# Patient Record
Sex: FEMALE | URBAN - METROPOLITAN AREA
[De-identification: names, ages, dates, MRNs, and addresses within clinical notes are randomized per-mention and may not be internally consistent; named-entity substitution may affect disease eponyms.]

---

## 2022-07-01 ENCOUNTER — APPOINTMENT (RX ONLY)
Dept: URBAN - METROPOLITAN AREA CLINIC 26 | Facility: CLINIC | Age: 62
Setting detail: DERMATOLOGY
End: 2022-07-01

## 2022-07-01 DIAGNOSIS — R23.3 SPONTANEOUS ECCHYMOSES: ICD-10-CM

## 2022-07-01 PROCEDURE — 99202 OFFICE O/P NEW SF 15 MIN: CPT

## 2022-07-01 PROCEDURE — ? DIAGNOSIS COMMENT

## 2022-07-01 PROCEDURE — ? COUNSELING

## 2022-07-01 PROCEDURE — ? ADDITIONAL NOTES

## 2022-07-01 ASSESSMENT — LOCATION SIMPLE DESCRIPTION DERM: LOCATION SIMPLE: RIGHT ELBOW

## 2022-07-01 ASSESSMENT — LOCATION DETAILED DESCRIPTION DERM: LOCATION DETAILED: RIGHT ELBOW

## 2022-07-01 ASSESSMENT — LOCATION ZONE DERM: LOCATION ZONE: ARM

## 2022-07-01 NOTE — PROCEDURE: DIAGNOSIS COMMENT
Render Risk Assessment In Note?: no
Comment: Based on patient history and photos, clear on exam today
Detail Level: Simple

## 2022-07-01 NOTE — HPI: SKIN LESION
How Severe Is Your Skin Lesion?: mild
Is This A New Presentation, Or A Follow-Up?: Skin Lesion
Which Family Member (Optional)?: Father
Additional History: Patient states she treated the lesion with medical jair and it has cleared.

## 2025-02-05 ENCOUNTER — APPOINTMENT (OUTPATIENT)
Dept: RADIOLOGY | Facility: CLINIC | Age: 65
End: 2025-02-05
Payer: MEDICARE

## 2025-02-05 ENCOUNTER — OFFICE VISIT (OUTPATIENT)
Dept: OBGYN CLINIC | Facility: CLINIC | Age: 65
End: 2025-02-05
Payer: MEDICARE

## 2025-02-05 VITALS — WEIGHT: 199 LBS | BODY MASS INDEX: 31.23 KG/M2 | HEIGHT: 67 IN

## 2025-02-05 DIAGNOSIS — M75.41 IMPINGEMENT SYNDROME OF RIGHT SHOULDER: Primary | ICD-10-CM

## 2025-02-05 DIAGNOSIS — M25.511 ACUTE PAIN OF RIGHT SHOULDER: ICD-10-CM

## 2025-02-05 DIAGNOSIS — M75.81 TENDINITIS OF RIGHT ROTATOR CUFF: ICD-10-CM

## 2025-02-05 PROCEDURE — 20610 DRAIN/INJ JOINT/BURSA W/O US: CPT | Performed by: ORTHOPAEDIC SURGERY

## 2025-02-05 PROCEDURE — 73030 X-RAY EXAM OF SHOULDER: CPT

## 2025-02-05 PROCEDURE — 99204 OFFICE O/P NEW MOD 45 MIN: CPT | Performed by: ORTHOPAEDIC SURGERY

## 2025-02-05 RX ORDER — METOPROLOL SUCCINATE 50 MG/1
50 TABLET, EXTENDED RELEASE ORAL DAILY
COMMUNITY

## 2025-02-05 RX ORDER — ATORVASTATIN CALCIUM 20 MG/1
20 TABLET, FILM COATED ORAL DAILY
COMMUNITY

## 2025-02-05 RX ORDER — FAMOTIDINE 10 MG
40 TABLET ORAL DAILY
COMMUNITY

## 2025-02-05 RX ORDER — HYOSCYAMINE SULFATE 0.125 MG
0.12 TABLET ORAL EVERY 4 HOURS PRN
COMMUNITY

## 2025-02-05 RX ORDER — BETAMETHASONE SODIUM PHOSPHATE AND BETAMETHASONE ACETATE 3; 3 MG/ML; MG/ML
6 INJECTION, SUSPENSION INTRA-ARTICULAR; INTRALESIONAL; INTRAMUSCULAR; SOFT TISSUE
Status: COMPLETED | OUTPATIENT
Start: 2025-02-05 | End: 2025-02-05

## 2025-02-05 RX ORDER — LIDOCAINE HYDROCHLORIDE 10 MG/ML
5 INJECTION, SOLUTION INFILTRATION; PERINEURAL
Status: COMPLETED | OUTPATIENT
Start: 2025-02-05 | End: 2025-02-05

## 2025-02-05 RX ORDER — FLUTICASONE PROPIONATE 50 MCG
1 SPRAY, SUSPENSION (ML) NASAL DAILY
COMMUNITY

## 2025-02-05 RX ORDER — LIDOCAINE 50 MG/G
1 PATCH TOPICAL DAILY
COMMUNITY
Start: 2024-11-25

## 2025-02-05 RX ORDER — EZETIMIBE 10 MG/1
10 TABLET ORAL DAILY
COMMUNITY

## 2025-02-05 RX ORDER — LOSARTAN POTASSIUM 50 MG/1
50 TABLET ORAL DAILY
COMMUNITY

## 2025-02-05 RX ADMIN — LIDOCAINE HYDROCHLORIDE 5 ML: 10 INJECTION, SOLUTION INFILTRATION; PERINEURAL at 13:00

## 2025-02-05 RX ADMIN — BETAMETHASONE SODIUM PHOSPHATE AND BETAMETHASONE ACETATE 6 MG: 3; 3 INJECTION, SUSPENSION INTRA-ARTICULAR; INTRALESIONAL; INTRAMUSCULAR; SOFT TISSUE at 13:00

## 2025-02-05 NOTE — PROGRESS NOTES
Assessment:     1. Impingement syndrome of right shoulder    2. Tendinitis of right rotator cuff    3. Acute pain of right shoulder        Plan:     Problem List Items Addressed This Visit          Musculoskeletal and Integument    Impingement syndrome of right shoulder - Primary    Relevant Medications    betamethasone acetate-betamethasone sodium phosphate (CELESTONE) injection 6 mg (Completed)    lidocaine (XYLOCAINE) 1 % injection 5 mL (Completed)    Other Relevant Orders    Large joint arthrocentesis: R subacromial bursa (Completed)    Ambulatory Referral to Physical Therapy    Tendinitis of right rotator cuff    Relevant Medications    betamethasone acetate-betamethasone sodium phosphate (CELESTONE) injection 6 mg (Completed)    lidocaine (XYLOCAINE) 1 % injection 5 mL (Completed)    Other Relevant Orders    Large joint arthrocentesis: R subacromial bursa (Completed)    Ambulatory Referral to Physical Therapy     Other Visit Diagnoses         Acute pain of right shoulder        Relevant Orders    XR shoulder 2+ vw right         Findings consistent with right shoulder impingement, tendonitis, mild ac joint arthritis. X-ray and prognosis reviewed with patient. Reviewed conservative treatment of her condition. Patient was given subacromial cortisone injection in right shoulder, tolerated procedure well, cold compress today. Main treatment is physical therapy and referral placed. Avoid strenuous lifting, repetitive overhead movements. Continue with topical voltaren gel and add tylenol for pain control. See patient back in 2 months to re evaluate. No concern for rotator cuff tear. All patient's questions were answered to her satisfaction.  This note is created using dictation transcription.  It may contain typographical errors, grammatical errors, improperly dictated words, background noise and other errors.    Subjective:     Patient ID: Maribel Albarado is a 64 y.o. female.  Chief Complaint:  64 yr old female in for  evaluation of right shoulder pain. No known injury. Pain started in December. She thinks she may have slept wrong on arm but doesn't recall waking up with pain. Pain lateral aspect of shoulder to elbow and not below. Pain can be mild to sharp but typically sharp with movement reaching above, behind out to grab items. She maintains full motion but with pain. She does have history of cervical fusion 2 levels lower and has no numbness or tingling in arm. No pain in elbow joint.     Allergy:  Allergies   Allergen Reactions    Pregabalin Shortness Of Breath     Worst asthma attack ever    Amitriptyline Swelling    Aspirin GI Intolerance     Not 81 mg     Celebrex [Celecoxib] GI Intolerance    Codeine Dizziness     Tired difficulty concentrating    Gabapentin Swelling     Rash constipation    Latex Rash    Nortriptyline Swelling and Rash     Medications:  all current active meds have been reviewed  Past Medical History:  Past Medical History:   Diagnosis Date    Asthma     Breast cancer (HCC)     Colitis     Costochondritis     Degenerative arthritis     Diverticulosis     Hypertension     Ischemia      Past Surgical History:  Past Surgical History:   Procedure Laterality Date    BREAST LUMPECTOMY N/A     CARPAL TUNNEL RELEASE      TUBAL LIGATION Bilateral      Family History:  Family History   Problem Relation Age of Onset    Cancer Mother     Vascular Disease Father     Cancer Father      Social History:  Social History     Substance and Sexual Activity   Alcohol Use None     Social History     Substance and Sexual Activity   Drug Use Not on file     Social History     Tobacco Use   Smoking Status Never   Smokeless Tobacco Never     Review of Systems   Constitutional:  Negative for chills and fever.   HENT:  Negative for ear pain and sore throat.    Eyes:  Negative for pain and visual disturbance.   Respiratory:  Negative for cough and shortness of breath.    Cardiovascular:  Negative for chest pain and palpitations.  "  Gastrointestinal:  Negative for abdominal pain and vomiting.   Genitourinary:  Negative for dysuria and hematuria.   Musculoskeletal:  Positive for arthralgias (right shoulder). Negative for back pain.   Skin:  Negative for color change and rash.   Neurological:  Negative for seizures and syncope.   All other systems reviewed and are negative.        Objective:  BP Readings from Last 1 Encounters:   No data found for BP      Wt Readings from Last 1 Encounters:   02/05/25 90.3 kg (199 lb)      BMI:   Estimated body mass index is 31.17 kg/m² as calculated from the following:    Height as of this encounter: 5' 7\" (1.702 m).    Weight as of this encounter: 90.3 kg (199 lb).  BSA:   Estimated body surface area is 2.02 meters squared as calculated from the following:    Height as of this encounter: 5' 7\" (1.702 m).    Weight as of this encounter: 90.3 kg (199 lb).   Physical Exam  Vitals and nursing note reviewed.   Constitutional:       Appearance: Normal appearance. She is well-developed.   HENT:      Head: Normocephalic and atraumatic.      Right Ear: External ear normal.      Left Ear: External ear normal.   Eyes:      Extraocular Movements: Extraocular movements intact.      Conjunctiva/sclera: Conjunctivae normal.   Pulmonary:      Effort: Pulmonary effort is normal.   Musculoskeletal:         General: Tenderness (right shoulder arthralgia) present.      Cervical back: Neck supple.   Skin:     General: Skin is warm and dry.   Neurological:      Mental Status: She is alert and oriented to person, place, and time.      Deep Tendon Reflexes: Reflexes are normal and symmetric.   Psychiatric:         Mood and Affect: Mood normal.         Behavior: Behavior normal.       Right Shoulder Exam     Tenderness   Right shoulder tenderness location: anterolateral aspect of shoulder.    Range of Motion   Active abduction:  abnormal Right shoulder active abduction: pain.  Passive abduction:  normal   Forward flexion:  170 (pain) "     Muscle Strength   Abduction: 5/5   Internal rotation: 5/5   External rotation: 5/5   Supraspinatus: 5/5   Biceps: 5/5     Tests   Cross arm: negative  Impingement: positive  Drop arm: negative    Other   Erythema: absent  Scars: absent  Sensation: normal  Pulse: present    Comments:  Good strength against resisted ABD  Pain with speed's and O'briens            I have personally reviewed pertinent films in PACS and my interpretation is x-ray right shoulder well maintained glenohumeral space and mild ac joint arthritis, no calcifications, no fracture, dislocation.      Large joint arthrocentesis: R subacromial bursa  Universal Protocol:  Consent: Verbal consent obtained.  Risks and benefits: risks, benefits and alternatives were discussed  Consent given by: patient  Patient understanding: patient states understanding of the procedure being performed  Site marked: the operative site was marked  Patient identity confirmed: verbally with patient  Supporting Documentation  Indications: pain   Procedure Details  Location: shoulder - R subacromial bursa  Preparation: Patient was prepped and draped in the usual sterile fashion  Needle size: 22 G  Ultrasound guidance: no  Approach: posterolateral  Medications administered: 6 mg betamethasone acetate-betamethasone sodium phosphate 6 (3-3) mg/mL; 5 mL lidocaine 1 %    Patient tolerance: patient tolerated the procedure well with no immediate complications  Dressing:  Sterile dressing applied        Scribe Attestation      I,:  Tam Moore am acting as a scribe while in the presence of the attending physician.:       I,:  Ashkan Thornton MD personally performed the services described in this documentation    as scribed in my presence.:

## 2025-02-06 ENCOUNTER — TELEPHONE (OUTPATIENT)
Dept: OBGYN CLINIC | Facility: CLINIC | Age: 65
End: 2025-02-06

## 2025-02-17 ENCOUNTER — EVALUATION (OUTPATIENT)
Dept: PHYSICAL THERAPY | Facility: CLINIC | Age: 65
End: 2025-02-17
Payer: MEDICARE

## 2025-02-17 DIAGNOSIS — M75.41 IMPINGEMENT SYNDROME OF RIGHT SHOULDER: ICD-10-CM

## 2025-02-17 DIAGNOSIS — M75.81 TENDINITIS OF RIGHT ROTATOR CUFF: ICD-10-CM

## 2025-02-17 PROCEDURE — 97530 THERAPEUTIC ACTIVITIES: CPT | Performed by: PHYSICAL THERAPIST

## 2025-02-17 PROCEDURE — 97161 PT EVAL LOW COMPLEX 20 MIN: CPT | Performed by: PHYSICAL THERAPIST

## 2025-02-17 PROCEDURE — 97112 NEUROMUSCULAR REEDUCATION: CPT | Performed by: PHYSICAL THERAPIST

## 2025-02-17 NOTE — PROGRESS NOTES
PT Evaluation     Today's date: 2025  Patient name: Maribel Albarado  : 1960  MRN: [id number]  Referring provider: Ashkan Thornton MD  Dx:   Encounter Diagnosis     ICD-10-CM    1. Tendinitis of right rotator cuff  M75.81 Ambulatory Referral to Physical Therapy      2. Impingement syndrome of right shoulder  M75.41 Ambulatory Referral to Physical Therapy             Assessment  Impairments: abnormal or restricted ROM, activity intolerance, impaired physical strength and pain with function  Symptom irritability: low    Assessment details: Maribel Albarado is a 65 y.o. female presenting to outpatient physical therapy at [facility] with complaints of R shoulder pain, stiffness and weakness. She presents with decreased range of motion, decreased strength, limited flexibility, poor postural awareness, poor body mechanics, decreased tolerance to activity and decreased functional mobility due to Tendinitis of right rotator cuff, Impingement syndrome of right shoulder.  She would benefit from skilled PT services in order to address these deficits and reach maximum level of function.  Thank you for the referral!    Understanding of Dx/Px/POC: good     Prognosis: good    Goals  STG  1. Independent with HEP in 2 weeks  2. Decrease pain at worst by 50% in 2 weeks    LTG  1. Increase R shoulder strength in all planes to 5/5 in 5 weeks  2. Return to full, pain-free and unrestricted reaching and lifting ADLs in 5 weeks        Plan  Patient would benefit from: skilled physical therapy  Planned modality interventions: thermotherapy: hydrocollator packs    Planned therapy interventions: manual therapy, neuromuscular re-education, therapeutic exercise and therapeutic activities    Frequency: 2x week  Duration in weeks: 5  Treatment plan discussed with: patient      Subjective Evaluation    History of Present Illness  Mechanism of injury: Recently c/o R shoulder pain, presenting today with dx of R shoulder impingement. Pt  believes her R shoulder pain was from being moved from surgical table via pulling her arms (spinal simulator 10/25/204). Recent cortisone injection by Dr. Thornton has improved her symptoms. Denies recent neck injuries, however PMH includes C6-7 fusion As well as  x4 spinal cord stimulator surgical procedures. Denies pain without coughing/sneezing, no pain at night in R shoulder.  Quality of life: good    Patient Goals  Patient goals for therapy: decreased pain, increased motion, increased strength and independence with ADLs/IADLs  Patient goal: lift 10-30lbs for ADLs  Pain  Current pain ratin  At worst pain rating: 10  Location: R anterolateral shoulder as well as R biceps and triceps  Quality: dull ache and sharp  Relieving factors: medications  Aggravating factors: lifting and overhead activity  Progression: improved      Diagnostic Tests  X-ray: normal  Treatments  Previous treatment: injection treatment      Objective     Palpation   Left   Tenderness of the infraspinatus, supraspinatus, teres major and teres minor.   Trigger point to infraspinatus.     Neurological Testing     Sensation     Shoulder   Left Shoulder   Intact: light touch    Right Shoulder   Intact: Light touch    Active Range of Motion   Left Shoulder   Flexion: 130 degrees with pain  Extension: 30 degrees with pain  External rotation 0°: 50 degrees with pain  External rotation BTH: C2 with pain  Internal rotation BTB: L1 with pain    Strength/Myotome Testing     Left Shoulder   Normal muscle strength    Right Shoulder     Planes of Motion   Flexion: 4+   Extension: 4   Abduction: 4   External rotation at 0°: 4   Internal rotation at 0°: 4   Horizontal abduction: 4     Isolated Muscles   Lower trapezius: 4   Middle trapezius: 4     Tests     Right Shoulder   Positive crossover, Hawkin's and passive horizontal adduction.   Negative drop arm, empty can and external rotation lag sign.             Daily Treatment Diary     HEP ACCESS CODE: [id number]  "  FOTO Completed On: 2/17/2025    POC Expires Reeval for Medicare to be completed  Unit Limit Auth Expiration Date PT/OT/STVisit Limit   3/30/25 By visit 10 na na bomn    Completed on visit                    Auth Status DATE 2/17        Approved Visit # 1         Remaining         MANUAL THERAPY         R RTC TrP & MFR         R shoulder PROm                                             THERAPEUTIC EXERCISE HEP         OH pulleys          Doorway pec stretch           Open book stretch          Wand Ext          Wand IR          Wand OH flx                                                                                                    NEUROMUSCULAR REEDUCATION           Mid row tband          Shld ext tband          ER/IR tband          Supine h-abd band          Sideying trio          Standing trio          Supine alphabet          Scapular retraction iso 2/17 10x10\"                                                                    THERAPEUTIC ACTIVITY          Patient education: pathoanatomy, nature of sxs, POC, HEP  NS        UBE w/u                              GAIT TRAINING                                                  MODALITIES                                         "

## 2025-03-03 ENCOUNTER — OFFICE VISIT (OUTPATIENT)
Dept: PHYSICAL THERAPY | Facility: CLINIC | Age: 65
End: 2025-03-03
Payer: MEDICARE

## 2025-03-03 DIAGNOSIS — M75.81 TENDINITIS OF RIGHT ROTATOR CUFF: Primary | ICD-10-CM

## 2025-03-03 DIAGNOSIS — M75.41 IMPINGEMENT SYNDROME OF RIGHT SHOULDER: ICD-10-CM

## 2025-03-03 PROCEDURE — 97140 MANUAL THERAPY 1/> REGIONS: CPT

## 2025-03-03 PROCEDURE — 97112 NEUROMUSCULAR REEDUCATION: CPT

## 2025-03-03 PROCEDURE — 97110 THERAPEUTIC EXERCISES: CPT

## 2025-03-03 NOTE — PROGRESS NOTES
"Daily Note     Today's date: 3/3/2025  Patient name: Maribel Albarado  : 1960  MRN: [id number]  Referring provider: Ashkan Thornton MD  Dx:   Encounter Diagnosis     ICD-10-CM    1. Tendinitis of right rotator cuff  M75.81       2. Impingement syndrome of right shoulder  M75.41                      Subjective: Pt reports her shld feels pretty good, but everything else on her body hurts.      Objective: See treatment diary below      Assessment: Tolerated treatment fair. Patient demonstrated fatigue post treatment and would benefit from continued PT for c cont'd work on shld ROM and strengthening.  Pt txfs slow and gingerly.  Pt able to lie supine on a pillow.  Pt's LBP monitored t/o session.      Plan: Continue per plan of care.  Progress treatment as tolerated.       Daily Treatment Diary     HEP ACCESS CODE: [id number]   FOTO Completed On: 2025    POC Expires Reeval for Medicare to be completed  Unit Limit Auth Expiration Date PT/OT/STVisit Limit   3/30/25 By visit 10 na na bomn    Completed on visit                    Auth Status DATE 2/17 3/3       Approved Visit # 1 2        Remaining         MANUAL THERAPY         R RTC TrP & MFR         R shoulder PROm                                             THERAPEUTIC EXERCISE HEP         OH pulleys   3'flex       Doorway pec stretch           Open book stretch          Wand Ext   10x5\"       Wand IR   10x5\"       Wand OH flx   15x5\"                                                                                                 NEUROMUSCULAR REEDUCATION           Mid row tband   OTB x15       Shld ext tband   OTB x15       ER/IR tband   ER BL peach TB 15x3\"       Supine h-abd band   [place] TB 15x5\"       Sideying trio          Standing trio          Supine alphabet          Scapular retraction iso  10x10\" 10x5\"                                                                   THERAPEUTIC ACTIVITY          Patient education: pathoanatomy, nature of sxs, POC, " HEP  NS        UBE w/u seated   2'/2'                           GAIT TRAINING                                                  MODALITIES

## 2025-03-05 ENCOUNTER — OFFICE VISIT (OUTPATIENT)
Dept: PHYSICAL THERAPY | Facility: CLINIC | Age: 65
End: 2025-03-05
Payer: MEDICARE

## 2025-03-05 DIAGNOSIS — M75.81 TENDINITIS OF RIGHT ROTATOR CUFF: Primary | ICD-10-CM

## 2025-03-05 DIAGNOSIS — M75.41 IMPINGEMENT SYNDROME OF RIGHT SHOULDER: ICD-10-CM

## 2025-03-05 PROCEDURE — 97110 THERAPEUTIC EXERCISES: CPT

## 2025-03-05 PROCEDURE — 97140 MANUAL THERAPY 1/> REGIONS: CPT

## 2025-03-05 PROCEDURE — 97112 NEUROMUSCULAR REEDUCATION: CPT

## 2025-03-05 NOTE — PROGRESS NOTES
"Daily Note     Today's date: 3/5/2025  Patient name: Maribel Albarado  : 1960  MRN: [id number]  Referring provider: Ashkan Thornton MD  Dx:   Encounter Diagnosis     ICD-10-CM    1. Tendinitis of right rotator cuff  M75.81       2. Impingement syndrome of right shoulder  M75.41                      Subjective: Pt reports she had increased LBP post LV and prefers not to lie supine.      Objective: See treatment diary below      Assessment: Tolerated treatment well w/ progression of shld ex's. Patient w/ TrP's in infraspinatus resolved w/ manual TrP rls.  Pt w/ improved shld ROM w/ no pain reported post RX.  All ex's and manuals performed in sitting or standing to prevent aggravating the LB.  Pt w/ ant shld posture.  Pt made aware of correct shld posture and instructed to be more aware to correct it.      Plan: Continue per plan of care.  Progress treatment as tolerated.       Daily Treatment Diary     HEP ACCESS CODE: [id number]   FOTO Completed On: 2025    POC Expires Reeval for Medicare to be completed  Unit Limit Auth Expiration Date PT/OT/STVisit Limit   3/30/25 By visit 10 na na bomn    Completed on visit                    Auth Status DATE 2/17 3/3 3/5      Approved Visit # 1 2 3       Remaining         MANUAL THERAPY         R RTC TrP & MFR sitting   JK      R shoulder PROm sitting   JK                                          THERAPEUTIC EXERCISE HEP         OH pulleys   3'flex 3'flex      Doorway pec stretch           Open book stretch          Wand Ext   10x5\" 10x5\"      Wand IR   10x5\" 10x5\"      Wand OH flx stand   15x5\" 10x5\"                                                                                                NEUROMUSCULAR REEDUCATION           Mid row tband   OTB x15 OTB x20      Shld ext tband   OTB x15 OTB x20      ER/IR tband   ER BL peach TB 15x3\" ER BL peach TB 15x3\"      Supine h-abd band   [place] TB 15x5\" [place] TB 15x5\"      Sideying trio          Standing trio    10x      Supine " "alphabet          Scapular retraction iso 2/17 10x10\" 10x5\" 10x5\"                                                                  THERAPEUTIC ACTIVITY          Patient education: pathoanatomy, nature of sxs, POC, HEP  NS        UBE w/u seated   2'/2' 2'/2'                          GAIT TRAINING                                                  MODALITIES                                          "

## 2025-03-10 ENCOUNTER — OFFICE VISIT (OUTPATIENT)
Dept: PHYSICAL THERAPY | Facility: CLINIC | Age: 65
End: 2025-03-10
Payer: MEDICARE

## 2025-03-10 DIAGNOSIS — M75.41 IMPINGEMENT SYNDROME OF RIGHT SHOULDER: ICD-10-CM

## 2025-03-10 DIAGNOSIS — M75.81 TENDINITIS OF RIGHT ROTATOR CUFF: Primary | ICD-10-CM

## 2025-03-10 PROCEDURE — 97112 NEUROMUSCULAR REEDUCATION: CPT

## 2025-03-10 PROCEDURE — 97140 MANUAL THERAPY 1/> REGIONS: CPT

## 2025-03-10 PROCEDURE — 97110 THERAPEUTIC EXERCISES: CPT

## 2025-03-12 ENCOUNTER — OFFICE VISIT (OUTPATIENT)
Dept: PHYSICAL THERAPY | Facility: CLINIC | Age: 65
End: 2025-03-12
Payer: MEDICARE

## 2025-03-12 DIAGNOSIS — M75.41 IMPINGEMENT SYNDROME OF RIGHT SHOULDER: ICD-10-CM

## 2025-03-12 DIAGNOSIS — M75.81 TENDINITIS OF RIGHT ROTATOR CUFF: Primary | ICD-10-CM

## 2025-03-12 PROCEDURE — 97110 THERAPEUTIC EXERCISES: CPT | Performed by: PHYSICAL THERAPIST

## 2025-03-12 NOTE — PROGRESS NOTES
"Daily Note     Today's date: 3/12/2025  Patient name: Maribel Albarado  : 1960  MRN: [id number]  Referring provider: Ashkan Thornton MD  Dx:   Encounter Diagnosis     ICD-10-CM    1. Tendinitis of right rotator cuff  M75.81       2. Impingement syndrome of right shoulder  M75.41           Start Time: 1411          Subjective: Woke up around 5am with shoulder pain. Thinks it is from doing too much. Felt sore after therapy last visit, but she is used to the arm being sore all the time.      Objective: See treatment diary below      Assessment: Did not perform manuals today secondary to time restraint. She was able to complete most exercises without pain, but commented the banded row irritated her stimulator. She was able to tolerate progression of band resistance. Ended with CP for recovery, pt favorable. She will benefit from continued therapy to maximize function.        Plan: Continue per plan of care.  Progress treatment as tolerated.   Possibly trial thoracic ext for improved shoulder mobility?     Daily Treatment Diary     HEP ACCESS CODE: [id number]   FOTO Completed On: 2025    POC Expires Reeval for Medicare to be completed  Unit Limit Auth Expiration Date PT/OT/STVisit Limit   3/30/25 By visit 10 na na bomn    Completed on visit                    Auth Status DATE 2/17 3/3 3/5 3/10 3/12    Approved Visit # 1 2 3 4 5     Remaining         MANUAL THERAPY         R RTC TrP & MFR sitting   JK JK     R shoulder PROm sitting   JK JK                                         THERAPEUTIC EXERCISE HEP         OH pulleys   3'flex 3'flex 3'flex Flex 3'    Doorway pec stretch           Open book stretch          Wand Ext   10x5\" 10x5\" 10x5\" 10x5\"    Wand IR   10x5\" 10x5\" 10x5\" 10x5\"    Wand OH flx stand   15x5\" 10x5\" 10x5\" 10x5\"                                                                                              NEUROMUSCULAR REEDUCATION           Mid row tband   OTB x15 OTB x20 OTB x20 Gtb x20    Shld ext " "tband   OTB x15 OTB x20 OTB x20 Gtb x20    ER/IR tband   ER BL peach TB 15x3\" ER BL peach TB 15x3\" Peach TB 15x5\" Peach TB 15x5\"    Shld abd band   [place] TB 15x5\" Peach TB 15x5\" Peach TB 15x5\" Peach TB 15x5\"    Sideying trio          Standing trio    10x 10x ea X10 ea    Supine alphabet          Scapular retraction iso 2/17 10x10\" 10x5\" 10x5\" 10x5\"                                                                 THERAPEUTIC ACTIVITY          Patient education: pathoanatomy, nature of sxs, POC, HEP  NS        UBE w/u seated   2'/2' 2'/2' 2'/2' 2'/2'                        GAIT TRAINING                                                  MODALITIES                                            "

## 2025-03-17 ENCOUNTER — OFFICE VISIT (OUTPATIENT)
Dept: PHYSICAL THERAPY | Facility: CLINIC | Age: 65
End: 2025-03-17
Payer: MEDICARE

## 2025-03-17 DIAGNOSIS — M75.41 IMPINGEMENT SYNDROME OF RIGHT SHOULDER: ICD-10-CM

## 2025-03-17 DIAGNOSIS — M75.81 TENDINITIS OF RIGHT ROTATOR CUFF: Primary | ICD-10-CM

## 2025-03-17 PROCEDURE — 97140 MANUAL THERAPY 1/> REGIONS: CPT | Performed by: PHYSICAL THERAPIST

## 2025-03-17 PROCEDURE — 97110 THERAPEUTIC EXERCISES: CPT | Performed by: PHYSICAL THERAPIST

## 2025-03-17 PROCEDURE — 97112 NEUROMUSCULAR REEDUCATION: CPT | Performed by: PHYSICAL THERAPIST

## 2025-03-17 NOTE — PROGRESS NOTES
"Daily Note     Today's date: 3/17/2025  Patient name: Maribel Albarado  : 1960  MRN: [id number]  Referring provider: Ashkan Thornton MD  Dx:   Encounter Diagnosis     ICD-10-CM    1. Tendinitis of right rotator cuff  M75.81       2. Impingement syndrome of right shoulder  M75.41                      Subjective: states her shoulder is very irritated after last visit      Objective: See treatment diary below      Assessment: PT trial of thoracic ext for improved shoulder mobility, pt reports \"this is the best it has felt.\" She will benefit from continued therapy to maximize function. Added t-spine ext to HEP, see handout.         Plan: Continue per plan of care.  Progress treatment as tolerated.        Daily Treatment Diary     HEP ACCESS CODE: [id number]   FOTO Completed On: 2025    POC Expires Reeval for Medicare to be completed  Unit Limit Auth Expiration Date PT/OT/STVisit Limit   3/30/25 By visit 10 na na bomn    Completed on visit                    Auth Status DATE 3/12 3/17    Approved Visit # 5 6     Remaining      MANUAL THERAPY      R RTC TrP & MFR sitting      R shoulder PROm sitting      FOTO  NS    Thoracic PA mobs  NS G3-4                THERAPEUTIC EXERCISE HEP      OH pulleys  3'flex Flex 3'    Doorway pec stretch        Open book stretch       Wand Ext  10x5\" 10x5\"    Wand IR  10x5\" 10x5\"    Wand OH flx stand  10x5\" 10x5\"    Thoracic ext chair   10x5\"                                                            NEUROMUSCULAR REEDUCATION        Mid row tband  OTB x20 Gtb x30    Shld ext tband  OTB x20 Gtb x30    ER/IR tband  [place] TB 15x5\" [place]ach TB 15x5\"    Shld abd band  [place] TB 15x5\" [place]ach TB 15x5\"    Sideying trio       Standing trio  10x ea X10 ea    Supine alphabet       Scapular retraction iso  10x5\" 10x5\"                                              THERAPEUTIC ACTIVITY       Patient education: pathoanatomy, nature of sxs, POC, HEP   NS    UBE w/u seated  2'/2' 2'/2'                "   GAIT TRAINING                                   MODALITIES      Heat on T spine  5'

## 2025-03-19 ENCOUNTER — OFFICE VISIT (OUTPATIENT)
Dept: PHYSICAL THERAPY | Facility: CLINIC | Age: 65
End: 2025-03-19
Payer: MEDICARE

## 2025-03-19 DIAGNOSIS — M75.81 TENDINITIS OF RIGHT ROTATOR CUFF: Primary | ICD-10-CM

## 2025-03-19 DIAGNOSIS — M75.41 IMPINGEMENT SYNDROME OF RIGHT SHOULDER: ICD-10-CM

## 2025-03-19 PROCEDURE — 97110 THERAPEUTIC EXERCISES: CPT | Performed by: PHYSICAL THERAPIST

## 2025-03-19 PROCEDURE — 97112 NEUROMUSCULAR REEDUCATION: CPT | Performed by: PHYSICAL THERAPIST

## 2025-03-19 NOTE — PROGRESS NOTES
"Daily Note     Today's date: 3/19/2025  Patient name: Maribel Albarado  : 1960  MRN: [id number]  Referring provider: Ashkan Thornton MD  Dx:   Encounter Diagnosis     ICD-10-CM    1. Tendinitis of right rotator cuff  M75.81       2. Impingement syndrome of right shoulder  M75.41                      Subjective: sxs improved after last session.       Objective: See treatment diary below      Assessment: Ptt tolerated continued MT and TE utilizing thoracic ext for improved shoulder mobility. Cont t-spine segmental hypomobility noted at this time. She will benefit from continued therapy to maximize function.         Plan: Continue per plan of care.  Progress treatment as tolerated.        Daily Treatment Diary     HEP ACCESS CODE: [id number]   FOTO Completed On: 2025    POC Expires Reeval for Medicare to be completed  Unit Limit Auth Expiration Date PT/OT/STVisit Limit   3/30/25 By visit 10 na na bomn    Completed on visit                    Auth Status DATE 3/17 3/19    Approved Visit # 6 7     Remaining      MANUAL THERAPY      R RTC TrP & MFR sitting      R shoulder PROm sitting      FOTO NS     Thoracic PA mobs NS G3-4 NS G3-4                THERAPEUTIC EXERCISE HEP      OH pulleys  3'flex Flex 3'    Doorway pec stretch        Open book stretch       Wand Ext  10x5\" 10x5\"    Wand IR  10x5\" 10x5\"    Wand OH flx stand  10x5\" 10x5\"    Thoracic ext chair  10x5\" 10x5\"                                                            NEUROMUSCULAR REEDUCATION        Mid row tband  OTB x20 Gtb x30    Shld ext tband  OTB x20 Gtb x30    ER/IR tband  [place] TB 15x5\" [place] TB 15x5\"    Shld abd band  [place] TB 15x5\" [place] TB 15x5\"    Sideying trio       Standing trio  10x ea X10 ea    Supine alphabet       Scapular retraction iso  10x5\" 10x5\"                                              THERAPEUTIC ACTIVITY       Patient education: pathoanatomy, nature of sxs, POC, HEP  NS NS    UBE w/u seated  2'/2'                   GAIT " TRAINING                                   MODALITIES      Heat on T spine  5'

## 2025-03-24 ENCOUNTER — APPOINTMENT (OUTPATIENT)
Dept: PHYSICAL THERAPY | Facility: CLINIC | Age: 65
End: 2025-03-24
Payer: MEDICARE

## 2025-03-26 ENCOUNTER — EVALUATION (OUTPATIENT)
Dept: PHYSICAL THERAPY | Facility: CLINIC | Age: 65
End: 2025-03-26
Payer: MEDICARE

## 2025-03-26 DIAGNOSIS — M75.81 TENDINITIS OF RIGHT ROTATOR CUFF: Primary | ICD-10-CM

## 2025-03-26 DIAGNOSIS — M75.41 IMPINGEMENT SYNDROME OF RIGHT SHOULDER: ICD-10-CM

## 2025-03-26 PROCEDURE — 97140 MANUAL THERAPY 1/> REGIONS: CPT | Performed by: PHYSICAL THERAPIST

## 2025-03-26 PROCEDURE — 97110 THERAPEUTIC EXERCISES: CPT | Performed by: PHYSICAL THERAPIST

## 2025-03-26 NOTE — PROGRESS NOTES
PT Re-Evaluation     Today's date: 3/26/2025  Patient name: Maribel Albarado  : 1960  MRN: [id number]  Referring provider: Ashkan Thornton MD  Dx:   Encounter Diagnosis     ICD-10-CM    1. Tendinitis of right rotator cuff  M75.81 Ambulatory Referral to Physical Therapy      2. Impingement syndrome of right shoulder  M75.41 Ambulatory Referral to Physical Therapy             Re-Assessment: Maribel Albarado is a 65 y.o. female seen in outpatient physical therapy at [facility] with complaints of R shoulder pain, stiffness and weakness. She has been treated for decreased range of motion, decreased strength, limited flexibility, poor postural awareness, poor body mechanics, decreased tolerance to activity and decreased functional mobility due to Tendinitis of right rotator cuff, Impingement syndrome of right shoulder.  Re-evaluation was performed to assess if she would benefit from skilled PT services in order to address these deficits and reach maximum level of function.  Thank you for the referral!    Understanding of Dx/Px/POC: good     Prognosis: good    Goals  STG  1. Independent with HEP in 2 weeks      Goal met  2. Decrease pain at worst by 50% in 2 weeks    Not met - improved frequency however 10/10     LTG   1. Increase R shoulder strength in all planes to 5/5 in 5 weeks    Goal met  2. Return to full, pain-free and unrestricted reaching and lifting ADLs in 5 weeks  Goal met        Plan  Patient has made good progress in PT and will return to physician for f/u early next month. DC to HEP today.   Treatment plan discussed with: patient      Subjective Evaluation    History of Present Illness  Mechanism of injury: Recently c/o R shoulder pain, presenting today with dx of R shoulder impingement. Pt believes her R shoulder pain was from being moved from surgical table via pulling her arms (spinal simulator 10/25/204). Recent cortisone injection by Dr. Thornton has improved her symptoms. Denies recent neck injuries,  however PMH includes C6-7 fusion As well as  x4 spinal cord stimulator surgical procedures. Denies pain without coughing/sneezing, no pain at night in R shoulder.  Quality of life: good    Patient Goals  Patient goals for therapy: decreased pain, increased motion, increased strength and independence with ADLs/IADLs  Patient goal: lift 10-30lbs for ADLs  Pain  Current pain ratin  At worst pain rating: 10       10 however less frequent    Location: R anterolateral shoulder as well as R biceps and triceps  Quality: dull ache and sharp  Relieving factors: medications  Aggravating factors: lifting and overhead activity  Progression: improved      Diagnostic Tests  X-ray: normal  Treatments  Previous treatment: injection treatment      Objective     Palpation   Right    Tenderness of the infraspinatus, supraspinatus, teres major and teres minor.      Positive   Trigger point to infraspinatus.            Positive      Neurological Testing     Sensation     Shoulder   Left Shoulder   Intact: light touch    Right Shoulder   Intact: Light touch    Active Range of Motion   Right Shoulder   Flexion: 130 degrees with pain    135 deg slight pain   Extension: 30 degrees with pain    60 deg slight pain  External rotation 0°: 50 degrees with pain   60 deg painfree  External rotation BTH: C2 with pain    C2 painfree  Internal rotation BTB: L1 with pain    L1 painfree    Strength/Myotome Testing     Left Shoulder   Normal muscle strength    Right Shoulder     Planes of Motion   Flexion: 4+     5  Extension: 4     5  Abduction: 4     5  External rotation at 0°: 4   5  Internal rotation at 0°: 4   5  Horizontal abduction: 4   5    Isolated Muscles   Lower trapezius: 4    5  Middle trapezius: 4    5    Tests     Right Shoulder   Positive crossover, Hawkin's and passive horizontal adduction.    Negative   Negative drop arm, empty can and external rotation lag sign.           Daily Treatment Diary     HEP ACCESS CODE: [id number]   FOTO  "Completed On: 2/17/2025, 3/26/2025    POC Expires Reeval for Medicare to be completed  Unit Limit Auth Expiration Date PT/OT/STVisit Limit   3/30/25 By visit 10 na na bomn    Completed on visit                      Auth Status DATE 3/26    Approved Visit # 8     Remaining     MANUAL THERAPY     R RTC TrP & MFR sitting     R shoulder PROm sitting     FOTO NS    Thoracic PA mobs NS G3-4    RE NS         THERAPEUTIC EXERCISE HEP     OH pulleys  Flex 3'    Doorway pec stretch       Open book stretch      Wand Ext  10x5\"    Wand IR  10x5\"    Wand OH flx stand  10x5\"    Thoracic ext chair  10x5\"                                                    NEUROMUSCULAR REEDUCATION       Mid row tband  Gtb x30    Shld ext tband  Gtb x30    ER/IR tband  [place] TB 15x5\"    Shld abd band  [place] TB 15x5\"    Sideying trio      Standing trio  X10 ea    Supine alphabet      Scapular retraction iso 2/17 10x5\"                                        THERAPEUTIC ACTIVITY      Patient education: pathoanatomy, nature of sxs, POC, HEP  NS    UBE w/u seated                  GAIT TRAINING                              MODALITIES     Heat on T spine 5'                    "

## 2025-04-08 ENCOUNTER — OFFICE VISIT (OUTPATIENT)
Dept: OBGYN CLINIC | Facility: CLINIC | Age: 65
End: 2025-04-08
Payer: MEDICARE

## 2025-04-08 VITALS — WEIGHT: 199 LBS | BODY MASS INDEX: 31.23 KG/M2 | HEIGHT: 67 IN

## 2025-04-08 DIAGNOSIS — M75.81 TENDINITIS OF RIGHT ROTATOR CUFF: ICD-10-CM

## 2025-04-08 DIAGNOSIS — M75.41 IMPINGEMENT SYNDROME OF RIGHT SHOULDER: Primary | ICD-10-CM

## 2025-04-08 PROCEDURE — 99213 OFFICE O/P EST LOW 20 MIN: CPT | Performed by: ORTHOPAEDIC SURGERY

## 2025-04-08 NOTE — PROGRESS NOTES
Assessment:     1. Impingement syndrome of right shoulder    2. Tendinitis of right rotator cuff        Plan:     Problem List Items Addressed This Visit          Musculoskeletal and Integument    Impingement syndrome of right shoulder - Primary    Findings consistent with right shoulder tendinitis and impingement, concern for partial tear of the rotator cuff.  Findings and treatment options were discussed with the patient.  Patient continues to have pain despite conservative treatment with cortisone injection to the subacromial space and formal physical therapy.  Recommend MRI of the right shoulder to further evaluate the joint.  She is to continue home exercises in the meantime.  Follow-up after MRI and I will go over further treatment recommendations at that time.  All patient's questions were answered to her satisfaction.  This note is created using dictation transcription.  It may contain typographical errors, grammatical errors, improperly dictated words, background noise and other errors.         Relevant Orders    MRI shoulder right wo contrast    Tendinitis of right rotator cuff    Relevant Orders    MRI shoulder right wo contrast      Subjective:     Patient ID: Maribel Albarado is a 65 y.o. female.  Chief Complaint:  This is a 65-year-old white female following up for right shoulder impingement and tendinitis.  She was last seen 2 months ago and given a subacromial cortisone injection.  She did have some relief with the cortisone injection, but continued to have some pain.  She attended formal physical therapy as scheduled.  She was recently discharged to home exercise program.  She continues to have pain in the right shoulder at 5 out of 10.  It is worse with lifting and reaching behind her.  The pain is localized in the upper arm.  She does not take any medication for the pain, since she tries to avoid taking medication in general.  She states she is doing the home exercises with no  improvement.    Allergy:  Allergies   Allergen Reactions    Pregabalin Shortness Of Breath     Worst asthma attack ever    Amitriptyline Swelling    Aspirin GI Intolerance     Not 81 mg     Celebrex [Celecoxib] GI Intolerance    Codeine Dizziness     Tired difficulty concentrating    Gabapentin Swelling     Rash constipation    Latex Rash    Nortriptyline Swelling and Rash     Medications:  all current active meds have been reviewed  Past Medical History:  Past Medical History:   Diagnosis Date    Asthma     Breast cancer (HCC)     Colitis     Costochondritis     Degenerative arthritis     Diverticulosis     Hypertension     Ischemia      Past Surgical History:  Past Surgical History:   Procedure Laterality Date    BREAST LUMPECTOMY N/A     CARPAL TUNNEL RELEASE      TUBAL LIGATION Bilateral      Family History:  Family History   Problem Relation Age of Onset    Cancer Mother     Vascular Disease Father     Cancer Father      Social History:  Social History     Substance and Sexual Activity   Alcohol Use None     Social History     Substance and Sexual Activity   Drug Use Not on file     Social History     Tobacco Use   Smoking Status Never   Smokeless Tobacco Never     Review of Systems   Constitutional:  Negative for chills and fever.   HENT:  Negative for ear pain and sore throat.    Eyes:  Negative for pain and visual disturbance.   Respiratory:  Negative for cough and shortness of breath.    Cardiovascular:  Negative for chest pain and palpitations.   Gastrointestinal:  Negative for abdominal pain and vomiting.   Genitourinary:  Negative for dysuria and hematuria.   Musculoskeletal:  Positive for arthralgias (right shoulder). Negative for back pain.   Skin:  Negative for color change and rash.   Neurological:  Negative for seizures and syncope.   All other systems reviewed and are negative.        Objective:  BP Readings from Last 1 Encounters:   No data found for BP      Wt Readings from Last 1 Encounters:  "  04/08/25 90.3 kg (199 lb)      BMI:   Estimated body mass index is 31.17 kg/m² as calculated from the following:    Height as of this encounter: 5' 7\" (1.702 m).    Weight as of this encounter: 90.3 kg (199 lb).  BSA:   Estimated body surface area is 2.02 meters squared as calculated from the following:    Height as of this encounter: 5' 7\" (1.702 m).    Weight as of this encounter: 90.3 kg (199 lb).   Physical Exam  Vitals and nursing note reviewed.   Constitutional:       Appearance: Normal appearance. She is well-developed.   HENT:      Head: Normocephalic and atraumatic.      Right Ear: External ear normal.      Left Ear: External ear normal.   Eyes:      Extraocular Movements: Extraocular movements intact.      Conjunctiva/sclera: Conjunctivae normal.   Pulmonary:      Effort: Pulmonary effort is normal.   Musculoskeletal:      Cervical back: Neck supple.   Skin:     General: Skin is warm and dry.   Neurological:      Mental Status: She is alert and oriented to person, place, and time.      Deep Tendon Reflexes: Reflexes are normal and symmetric.   Psychiatric:         Mood and Affect: Mood normal.         Behavior: Behavior normal.       Right Shoulder Exam     Tenderness   Right shoulder tenderness location: anterolateral aspect of shoulder.    Range of Motion   Active abduction:  160 (pain) abnormal   Passive abduction:  normal   Forward flexion:  170 (pain)     Muscle Strength   Abduction: 5/5   Internal rotation: 5/5   External rotation: 5/5   Supraspinatus: 5/5   Biceps: 5/5     Tests   Ribera test: positive  Cross arm: negative  Impingement: positive  Drop arm: negative    Other   Erythema: absent  Scars: absent  Sensation: normal  Pulse: present    Comments:  Good strength against resisted ABD  Positive empty can  Pain with resisted abduction and external rotation            No new imaging.    Scribe Attestation      I,:  Yudelka Purdy PA-C am acting as a scribe while in the presence of the attending " physician.:       I,:  Ashkan Thornton MD personally performed the services described in this documentation    as scribed in my presence.:

## 2025-04-08 NOTE — QUICK NOTE
Investigation Report    Device investigated: [employer]            Method investigated (surgical report, imaging report, vendor contacted, website used etc): Tech Support, Epic, Chromatin    Time spent investigating in minutes: 15 minutes    Investigation findings: MR Conditional    Risk vs Benefit performed.  If so, list physician and outcome: n/a

## 2025-04-08 NOTE — ASSESSMENT & PLAN NOTE
Findings consistent with right shoulder tendinitis and impingement, concern for partial tear of the rotator cuff.  Findings and treatment options were discussed with the patient.  Patient continues to have pain despite conservative treatment with cortisone injection to the subacromial space and formal physical therapy.  Recommend MRI of the right shoulder to further evaluate the joint.  She is to continue home exercises in the meantime.  Follow-up after MRI and I will go over further treatment recommendations at that time.  All patient's questions were answered to her satisfaction.  This note is created using dictation transcription.  It may contain typographical errors, grammatical errors, improperly dictated words, background noise and other errors.

## 2025-04-23 ENCOUNTER — HOSPITAL ENCOUNTER (OUTPATIENT)
Dept: RADIOLOGY | Facility: HOSPITAL | Age: 65
Discharge: HOME | End: 2025-04-23

## 2025-04-23 DIAGNOSIS — Z96.89 S/P INSERTION OF SPINAL CORD STIMULATOR: ICD-10-CM

## 2025-05-05 ENCOUNTER — HOSPITAL ENCOUNTER (OUTPATIENT)
Dept: MRI IMAGING | Facility: HOSPITAL | Age: 65
Discharge: HOME | End: 2025-05-05
Payer: MEDICARE

## 2025-05-05 DIAGNOSIS — M75.41 IMPINGEMENT SYNDROME OF RIGHT SHOULDER: ICD-10-CM

## 2025-05-05 DIAGNOSIS — M75.81 TENDINITIS OF RIGHT ROTATOR CUFF: ICD-10-CM

## 2025-05-05 PROCEDURE — 73221 MRI JOINT UPR EXTREM W/O DYE: CPT

## 2025-05-13 ENCOUNTER — OFFICE VISIT (OUTPATIENT)
Dept: OBGYN CLINIC | Facility: CLINIC | Age: 65
End: 2025-05-13
Payer: MEDICARE

## 2025-05-13 VITALS — BODY MASS INDEX: 31.39 KG/M2 | HEIGHT: 67 IN | WEIGHT: 200 LBS

## 2025-05-13 DIAGNOSIS — M75.81 TENDINITIS OF RIGHT ROTATOR CUFF: ICD-10-CM

## 2025-05-13 DIAGNOSIS — M75.41 IMPINGEMENT SYNDROME OF RIGHT SHOULDER: Primary | ICD-10-CM

## 2025-05-13 PROCEDURE — 99213 OFFICE O/P EST LOW 20 MIN: CPT | Performed by: ORTHOPAEDIC SURGERY

## 2025-05-13 RX ORDER — FEXOFENADINE HCL 180 MG/1
180 TABLET ORAL DAILY
COMMUNITY
Start: 2025-05-08

## 2025-05-13 RX ORDER — ALBUTEROL SULFATE 90 UG/1
INHALANT RESPIRATORY (INHALATION)
COMMUNITY
Start: 2025-05-07

## 2025-05-13 RX ORDER — AZELASTINE HYDROCHLORIDE 137 UG/1
SPRAY, METERED NASAL 2 TIMES DAILY
COMMUNITY
Start: 2025-05-09

## 2025-05-13 NOTE — ASSESSMENT & PLAN NOTE
Findings consistent with right shoulder impingement with tendinitis.  Discussed findings and treatment options with the patient.  I reviewed patient's right shoulder MRI and radiology report with her.  I discussed prognosis of her shoulder condition.  I recommended patient to continue physical therapy for another 6 to 8 weeks.  I did discuss patient possible surgical interventions with right shoulder arthroscopy, subacromial decompression and examination of her rotator cuff IntraOp, if her shoulder pain does not resolve.  I also advised patient to use Voltaren gel.  Patient should avoid overhead reaching and lifting activities. I will see patient back in 6 to 8 weeks time for reevaluation.  All patient's questions were answered to her satisfaction.  This note is created using dictation transcription.  It may contain typographical errors, grammatical errors, improperly dictated words, background noise and other errors.

## 2025-05-13 NOTE — PROGRESS NOTES
Assessment:     1. Impingement syndrome of right shoulder    2. Tendinitis of right rotator cuff        Plan:     Problem List Items Addressed This Visit          Musculoskeletal and Integument    Impingement syndrome of right shoulder - Primary    Relevant Orders    Ambulatory Referral to Physical Therapy    Tendinitis of right rotator cuff    Findings consistent with right shoulder impingement with tendinitis.  Discussed findings and treatment options with the patient.  I reviewed patient's right shoulder MRI and radiology report with her.  I discussed prognosis of her shoulder condition.  I recommended patient to continue physical therapy for another 6 to 8 weeks.  I did discuss patient possible surgical interventions with right shoulder arthroscopy, subacromial decompression and examination of her rotator cuff IntraOp, if her shoulder pain does not resolve.  I also advised patient to use Voltaren gel.  Patient should avoid overhead reaching and lifting activities. I will see patient back in 6 to 8 weeks time for reevaluation.  All patient's questions were answered to her satisfaction.  This note is created using dictation transcription.  It may contain typographical errors, grammatical errors, improperly dictated words, background noise and other errors.         Relevant Orders    Ambulatory Referral to Physical Therapy      Subjective:     Patient ID: Maribel Albarado is a 65 y.o. female.  Chief Complaint:  This is a 65-year-old white female following up for right shoulder impingement and tendinitis.  She was last seen 2 months ago and given a subacromial cortisone injection.  She did have some relief with the cortisone injection, but continued to have some pain.  She attended formal physical therapy as scheduled.  She was recently discharged to home exercise program.  She continues to have pain in the right shoulder at 5 out of 10.  It is worse with lifting and reaching behind her.  The pain is localized in the  upper arm.  She does not take any medication for the pain, since she tries to avoid taking medication in general.  She states she is doing the home exercises with no improvement.  Patient is here to review her right shoulder MRI.    Allergy:  Allergies   Allergen Reactions    Pregabalin Shortness Of Breath     Worst asthma attack ever    Amitriptyline Swelling    Aspirin GI Intolerance     Not 81 mg     Celebrex [Celecoxib] GI Intolerance    Codeine Dizziness     Tired difficulty concentrating    Gabapentin Swelling     Rash constipation    Latex Rash    Nortriptyline Swelling and Rash     Medications:  all current active meds have been reviewed  Past Medical History:  Past Medical History:   Diagnosis Date    Asthma     Breast cancer (HCC)     Colitis     Costochondritis     Degenerative arthritis     Diverticulosis     Hypertension     Ischemia      Past Surgical History:  Past Surgical History:   Procedure Laterality Date    BREAST LUMPECTOMY N/A     CARPAL TUNNEL RELEASE      TUBAL LIGATION Bilateral      Family History:  Family History   Problem Relation Age of Onset    Cancer Mother     Vascular Disease Father     Cancer Father      Social History:  Social History     Substance and Sexual Activity   Alcohol Use None     Social History     Substance and Sexual Activity   Drug Use Not on file     Social History     Tobacco Use   Smoking Status Never   Smokeless Tobacco Never     Review of Systems   Constitutional:  Negative for chills and fever.   HENT:  Negative for ear pain and sore throat.    Eyes:  Negative for pain and visual disturbance.   Respiratory:  Negative for cough and shortness of breath.    Cardiovascular:  Negative for chest pain and palpitations.   Gastrointestinal:  Negative for abdominal pain and vomiting.   Genitourinary:  Negative for dysuria and hematuria.   Musculoskeletal:  Positive for arthralgias (right shoulder). Negative for back pain.   Skin:  Negative for color change and rash.  "  Neurological:  Negative for seizures and syncope.   All other systems reviewed and are negative.        Objective:  BP Readings from Last 1 Encounters:   No data found for BP      Wt Readings from Last 1 Encounters:   05/13/25 90.7 kg (200 lb)      BMI:   Estimated body mass index is 31.32 kg/m² as calculated from the following:    Height as of this encounter: 5' 7\" (1.702 m).    Weight as of this encounter: 90.7 kg (200 lb).  BSA:   Estimated body surface area is 2.02 meters squared as calculated from the following:    Height as of this encounter: 5' 7\" (1.702 m).    Weight as of this encounter: 90.7 kg (200 lb).   Physical Exam  Vitals and nursing note reviewed.   Constitutional:       Appearance: Normal appearance. She is well-developed.   HENT:      Head: Normocephalic and atraumatic.      Right Ear: External ear normal.      Left Ear: External ear normal.   Eyes:      Extraocular Movements: Extraocular movements intact.      Conjunctiva/sclera: Conjunctivae normal.   Pulmonary:      Effort: Pulmonary effort is normal.   Musculoskeletal:      Cervical back: Neck supple.   Skin:     General: Skin is warm and dry.   Neurological:      Mental Status: She is alert and oriented to person, place, and time.      Deep Tendon Reflexes: Reflexes are normal and symmetric.   Psychiatric:         Mood and Affect: Mood normal.         Behavior: Behavior normal.       Right Shoulder Exam     Tenderness   Right shoulder tenderness location: anterolateral aspect of shoulder.    Range of Motion   Active abduction:  160 (pain) abnormal   Passive abduction:  normal   Forward flexion:  170 (pain)     Muscle Strength   Abduction: 5/5   Internal rotation: 5/5   External rotation: 5/5   Supraspinatus: 5/5   Biceps: 5/5     Tests   Ribera test: positive  Cross arm: negative  Impingement: positive  Drop arm: negative    Other   Erythema: absent  Scars: absent  Sensation: normal  Pulse: present    Comments:  Good strength against " resisted ABD  Positive empty can  Pain with resisted abduction and external rotation            I have personally reviewed pertinent films in PACS and my interpretation is right shoulder MRI show intact rotator cuff with tendinosis there is subchondral cysts over the posterior aspect of the greater tuberosity.

## 2025-05-27 ENCOUNTER — TELEPHONE (OUTPATIENT)
Age: 65
End: 2025-05-27

## 2025-05-27 NOTE — TELEPHONE ENCOUNTER
The sooner she starts the faster she can potentially recover from this condition. She can hold off if she has to, but it will only prolong her recovery.

## 2025-05-27 NOTE — TELEPHONE ENCOUNTER
Caller: Maribel    Doctor: Dr. Thornton    Reason for call: Returning a call that she missed.Received VM but she would like to speak with someone about it.    Call back#: [phone]

## 2025-05-27 NOTE — TELEPHONE ENCOUNTER
Caller: patient     Doctor: Dr. Thornton     Reason for call: Patient stated she would like to hold off on starting her PT for a couple of weeks and is asking if Dr. Thornton thinks that is ok.    Call back#: [phone]

## 2025-05-28 NOTE — TELEPHONE ENCOUNTER
I spoke with Maribel and she stated that she needs to be cleared by her surgeon before she can start PT.  Because she will have to wait for that clearance she would not have that many sessions before her scheduled f/u with Dr. Thornton and she would like to know if that f/u should be pushed out a few weeks .  I will call her tomorrow after consultation with provider.

## 2025-05-28 NOTE — TELEPHONE ENCOUNTER
Caller: Mariebl     Doctor: Dr. Thornton / Pedro     Reason for call: Please see previous messages .    Patient would like to speak with Yudelka Yepez or someone on the team regarding message she received on VM.  Patient does not want to play phone tag.  She will wait while I reach out to Marleni.     Secure Chat sent to Marleni.  Waiting for response.   She was unable to speak with patient at this time.  Patient informed she would be contacted by the office .     Please call patient      Call back#: [phone]

## 2025-05-29 NOTE — TELEPHONE ENCOUNTER
Lm bossman López that we cancelled her appt for June with Dr. Thornton and that she can schedule a follow up with Dr. Thornton for 6 weeks after she starts physical therapy.

## 2025-06-18 ENCOUNTER — EVALUATION (OUTPATIENT)
Dept: PHYSICAL THERAPY | Facility: CLINIC | Age: 65
End: 2025-06-18
Attending: ORTHOPAEDIC SURGERY
Payer: MEDICARE

## 2025-06-18 DIAGNOSIS — M75.41 IMPINGEMENT SYNDROME OF RIGHT SHOULDER: ICD-10-CM

## 2025-06-18 DIAGNOSIS — M54.12 CERVICAL RADICULOPATHY: Primary | ICD-10-CM

## 2025-06-18 DIAGNOSIS — M75.81 TENDINITIS OF RIGHT ROTATOR CUFF: ICD-10-CM

## 2025-06-18 PROCEDURE — 97112 NEUROMUSCULAR REEDUCATION: CPT

## 2025-06-18 PROCEDURE — 97161 PT EVAL LOW COMPLEX 20 MIN: CPT

## 2025-06-18 NOTE — PROGRESS NOTES
PT Evaluation     Today's date: 2025  Patient name: Maribel Albarado  : 1960  MRN: [id number]  Referring provider: Ashkan Thornton MD  Dx:   Encounter Diagnosis     ICD-10-CM    1. Cervical radiculopathy  M54.12       2. Impingement syndrome of right shoulder  M75.41 Ambulatory Referral to Physical Therapy      3. Tendinitis of right rotator cuff  M75.81 Ambulatory Referral to Physical Therapy          Start Time: 1127  Stop Time: 1205  Total time in clinic (min): 38 minutes    Assessment  Impairments: abnormal or restricted ROM, activity intolerance, impaired physical strength, lacks appropriate home exercise program and pain with function  Symptom irritability: high    Assessment details: Maribel Albarado is a pleasant 65 y.o. female who presents with chronic R shoulder/UE pain >6 months in duration.  Patient had slight improvement with shoulder pain with PT about 3 months ago.  Given radiation of symptoms distally to hand and response to repeated motions at cervical spine, there appears to be a cervical component to symptoms.  Movement diagnosis of neck pain with radiating pain with primary neuropathic pain.  she has limited cervical and R shoulder ROM, slight R shoulder flexion and ABD weakness, and activity intolerance resulting in the pain her is experiencing, worry over not knowing what's wrong, and fear of not being able to keep active.  No further referral appears necessary at this time based upon examination results.  I expect she will improve with retraction based exercises and shoulder strengthening.  Positive prognostic indicators include absence of observed red flags.  Negative prognostic indicators include chronicity of symptoms, hypertension, high symptom irritability, degree of peripheralization, multiple prior failed treatments, and obesity.  Gave patient cervical retraction for HEP.  Educated patient on centralization of symptoms and encouraged her to stop HEP if symptoms move distally-  patient expressed understanding.  Patient would benefit from skilled PT services to address these deficits and return to PLOF.    Comparable signs:  1) R shoulder ABD MMT  2) R ulnar nerve ULTT  Understanding of Dx/Px/POC: good     Prognosis: good    Goals  STGs  Patient will be independent with HEP in 3 weeks  Patient will decrease pain by 2 points in 3 weeks  LTGs  Patient will be able to cook without increase in symptoms in 6 weeks  Patient will be able to take care of her laundry without increase in symptoms in 6 weeks  Patient will improve R shoulder ABD MMT by 1/3    Plan  Patient would benefit from: skilled physical therapy    Planned therapy interventions: activity modification, joint mobilization, manual therapy, motor coordination training, nerve gliding, neuromuscular re-education, patient/caregiver education, postural training, strengthening, stretching, flexibility, fine motor coordination training, functional ROM exercises, home exercise program, therapeutic activities and therapeutic exercise    Frequency: 1x week  Duration in weeks: 6  Plan of Care beginning date: 2025  Plan of Care expiration date: 2025  Treatment plan discussed with: patient        Subjective Evaluation    History of Present Illness  Mechanism of injury: Pain location: lateral aspect of R arm into forearm and sometimes into palm of hand  Pain severity: 5-0-10  Aggs: cooking, lifting, typing on computer, laundry  Eases: voltaren  Irritability: high  KRIS/timeline: R shoulder started bothering her in October last year, intense pain on 25 and feels shoulder pain is related to the surgeries for her foot, completed 6 weeks of PT with minimal improvement, MRI showed no tearing of shoulder muscles  Red flags: hx of breast cancer  PMH/PSH: multiple surgeries on R foot, spinal cord stimulator in place, hx of C5-6  Patient Goals  Patient goal: complete household chores, laundry, cook  Pain  Current pain ratin  At best pain  ratin  At worst pain rating: 10          Objective     Active Range of Motion   Cervical/Thoracic Spine       Cervical    Flexion:  Restriction level: minimal  Extension:  with pain Restriction level: maximal  Left lateral flexion:  Restriction level: moderate  Right lateral flexion:  Restriction level moderate  Left rotation: 55 degrees with pain  Right rotation: 55 degrees    with pain  Left Shoulder   Normal active range of motion    Right Shoulder   Flexion: 141 degrees   Abduction: WFL  Internal rotation BTB: T7 with pain  Mechanical Assessment    Cervical    Seated retraction: repeated movements   Pain location: centralized  Pain intensity: better  Pain level: decreased    Thoracic      Lumbar      Strength/Myotome Testing     Left Shoulder     Planes of Motion   Flexion: 5   Abduction: 5   External rotation at 0°: 5     Right Shoulder     Planes of Motion   Flexion: 4+   Abduction: 4+   External rotation at 0°: 5     Left Elbow   Flexion: 5  Extension: 5    Right Elbow   Flexion: 5  Extension: 5    Left Wrist/Hand   Thumb extension: 5    Right Wrist/Hand   Thumb extension: 5    Tests   Cervical   Negative vertical compression and cervical distraction.     Right Shoulder   Positive ULTT1 and ULTT2.     Lumbar   Negative vertical compression.     General Comments:      Cervical/Thoracic Comments  Decreased pain with overhead reaching following repeated retraction            Daily Treatment Diary     Precautions: asthma, hx breast cancer, HTN, SPINAL CORD STIMULATOR, C5-6 fusion    POC Expires Reeval for Medicare to be completed  Unit Limit Auth Expiration Date PT/OT/STVisit Limit   25 By visit 10 BOMN N/a BOMN    Completed on visit 1                   TREATMENT DIARY  Auth Status DATE         NA Visit # 1         Remaining         MANUAL THERAPY         Cervical PA mobs         Thoracic PA mobs         GH posterior mobs         GH inferior mobs         Thoracic ext MWM                            THERAPEUTIC EXERCISE HEP         TB row          TB ext          TB ER          SL ER          No monies          Prone Y          Prone T          SL ABD          SL horizontal ABD                                                  Pt edu          UBE          NEUROMUSCULAR REEDUCATION           Scap retraction          Cervical retraction x HEP        Retraction extension          Ulnar nerve glide          Median nerve glide                              Pt edu  GS- HEP, centralization                                                                    THERAPEUTIC ACTIVITY                                                  GAIT TRAINING                                                  MODALITIES

## 2025-06-18 NOTE — HOME EXERCISE EDUCATION
Program_ID:[id number]   Access Code: [email or web address]  URL: [email or web address]  Date: 06-  Prepared By: Alexis Lawrence    Program Notes      Exercises      - Seated Cervical Retraction - 7 x daily - 7 x weekly -  sets - 20 reps

## 2025-06-24 ENCOUNTER — OFFICE VISIT (OUTPATIENT)
Dept: PHYSICAL THERAPY | Facility: CLINIC | Age: 65
End: 2025-06-24
Attending: ORTHOPAEDIC SURGERY
Payer: MEDICARE

## 2025-06-24 DIAGNOSIS — M54.12 CERVICAL RADICULOPATHY: Primary | ICD-10-CM

## 2025-06-24 DIAGNOSIS — M75.81 TENDINITIS OF RIGHT ROTATOR CUFF: ICD-10-CM

## 2025-06-24 DIAGNOSIS — M75.41 IMPINGEMENT SYNDROME OF RIGHT SHOULDER: ICD-10-CM

## 2025-06-24 PROCEDURE — 97112 NEUROMUSCULAR REEDUCATION: CPT

## 2025-06-24 PROCEDURE — 97110 THERAPEUTIC EXERCISES: CPT

## 2025-06-24 NOTE — PROGRESS NOTES
Daily Note     Today's date: 2025  Patient name: Maribel Albarado  : 1960  MRN: [id number]  Referring provider: Ashkan Thornton MD  Dx:   Encounter Diagnosis     ICD-10-CM    1. Cervical radiculopathy  M54.12       2. Impingement syndrome of right shoulder  M75.41       3. Tendinitis of right rotator cuff  M75.81           Start Time: 1400  Stop Time: 1444  Total time in clinic (min): 44 minutes    Subjective: Patient reports that her shoulder and neck have actually been feeling better since starting HEP.  She states that she still has some pain in her shoulder but overall less pain with daily activities.      Objective: See treatment diary below      Assessment: Patient demonstrates improving cervical mobility this session.  Patient completed today's session with increase in pain when patient assumed supine position with neck flexed.  UBE performed in order to improve UE muscular endurance.  Unable to perform thoracic mobilizations due to sensitivity of incision from stimulator.  Peripheralization of symptoms with retraction extension.  Future sessions should continue to progress retraction exercises as able. Tolerated treatment fair. Patient would benefit from continued PT.      Plan: Continue per plan of care.      Daily Treatment Diary     Precautions: asthma, hx breast cancer, HTN, SPINAL CORD STIMULATOR, C5-6 fusion    POC Expires Reeval for Medicare to be completed  Unit Limit Auth Expiration Date PT/OT/STVisit Limit   25 By visit 10 BOMN N/a BOMN    Completed on visit 1                   TREATMENT DIARY  Auth Status DATE        NA Visit # 1 2        Remaining         MANUAL THERAPY                  Thoracic PA mobs         GH posterior mobs         GH inferior mobs         Thoracic ext MWM                           THERAPEUTIC EXERCISE HEP         TB row   X30  otb       TB ext   X30  otb       TB ER   def       SL ER   x20       No monies          Prone Y          Prone T          SL  ABD   x20       SL horizontal ABD                                                  Pt edu          UBE   3'3'       NEUROMUSCULAR REEDUCATION           Seated thoracic ext   x20       Cervical retraction x HEP 3x10  x20       Retraction extension   x10       Ulnar nerve glide          Median nerve glide                              Pt edu  GS- HEP, centralization                                                                    THERAPEUTIC ACTIVITY                                                  GAIT TRAINING                                                  MODALITIES

## 2025-07-02 ENCOUNTER — APPOINTMENT (OUTPATIENT)
Dept: PHYSICAL THERAPY | Facility: CLINIC | Age: 65
End: 2025-07-02
Attending: ORTHOPAEDIC SURGERY
Payer: MEDICARE

## 2025-07-07 ENCOUNTER — OFFICE VISIT (OUTPATIENT)
Dept: PHYSICAL THERAPY | Facility: CLINIC | Age: 65
End: 2025-07-07
Attending: ORTHOPAEDIC SURGERY
Payer: MEDICARE

## 2025-07-07 DIAGNOSIS — M75.81 TENDINITIS OF RIGHT ROTATOR CUFF: ICD-10-CM

## 2025-07-07 DIAGNOSIS — M75.41 IMPINGEMENT SYNDROME OF RIGHT SHOULDER: ICD-10-CM

## 2025-07-07 DIAGNOSIS — M54.12 CERVICAL RADICULOPATHY: Primary | ICD-10-CM

## 2025-07-07 PROCEDURE — 97112 NEUROMUSCULAR REEDUCATION: CPT

## 2025-07-07 PROCEDURE — 97110 THERAPEUTIC EXERCISES: CPT

## 2025-07-07 NOTE — PROGRESS NOTES
"Daily Note     Today's date: 2025  Patient name: Maribel Albarado  : 1960  MRN: [id number]  Referring provider: Ashkan Thornton MD  Dx:   Encounter Diagnosis     ICD-10-CM    1. Cervical radiculopathy  M54.12       2. Impingement syndrome of right shoulder  M75.41       3. Tendinitis of right rotator cuff  M75.81           Start Time: 1200  Stop Time: 1243  Total time in clinic (min): 43 minutes    Subjective: Patient reports that her shoulder has been feeling pretty good since last visit.  She states that she does have occasional bouts of pain when she turns it \"wrong\".      Objective: See treatment diary below      Assessment: Patient demonstrates improving R shoulder strength this session.  Patient completed today's session with increase in low back pain from standing with TB exercises.  Back pain reduced when exercise is modified to sitting position.  UBE performed in order to improve UE muscular endurance.  Able to perform retraction extensions today without peripheralization.  Future sessions should continue to progress strengthening exercises and cervical mobility exercises as able. Tolerated treatment fair. Patient would benefit from continued PT.      Plan: Continue per plan of care.      Daily Treatment Diary     Precautions: asthma, hx breast cancer, HTN, SPINAL CORD STIMULATOR, C5-6 fusion    POC Expires Reeval for Medicare to be completed  Unit Limit Auth Expiration Date PT/OT/STVisit Limit   25 By visit 10 BOMN N/a BOMN    Completed on visit 1                   TREATMENT DIARY  Auth Status DATE  7      NA Visit # 1 2 3       Remaining         MANUAL THERAPY                  Thoracic PA mobs         GH posterior mobs         GH inferior mobs         Thoracic ext MWM                           THERAPEUTIC EXERCISE HEP         TB row   X30  otb X30  Gtb  seated      TB ext   X30  otb X30  gtb      TB ER   def X15 light  Otb seated      SL ER   x20 X20  1#      No monies        "   Prone Y          Prone T          SL ABD   x20 X20  1#      SL horizontal ABD                                                  Pt edu          UBE   3'3' 3'3'      NEUROMUSCULAR REEDUCATION           Seated thoracic ext   x20 x20      Cervical retraction x HEP 3x10  x20 x30      Retraction extension   x10 x10      Ulnar nerve glide          Median nerve glide                              Pt edu  GS- HEP, centralization                                                                    THERAPEUTIC ACTIVITY                                                  GAIT TRAINING                                                  MODALITIES

## 2025-07-17 ENCOUNTER — OFFICE VISIT (OUTPATIENT)
Dept: PHYSICAL THERAPY | Facility: CLINIC | Age: 65
End: 2025-07-17
Attending: ORTHOPAEDIC SURGERY
Payer: MEDICARE

## 2025-07-17 DIAGNOSIS — M75.81 TENDINITIS OF RIGHT ROTATOR CUFF: ICD-10-CM

## 2025-07-17 DIAGNOSIS — M75.41 IMPINGEMENT SYNDROME OF RIGHT SHOULDER: ICD-10-CM

## 2025-07-17 DIAGNOSIS — M54.12 CERVICAL RADICULOPATHY: Primary | ICD-10-CM

## 2025-07-17 PROCEDURE — 97112 NEUROMUSCULAR REEDUCATION: CPT

## 2025-07-17 PROCEDURE — 97110 THERAPEUTIC EXERCISES: CPT

## 2025-07-17 NOTE — HOME EXERCISE EDUCATION
Program_ID:[id number]   Access Code: [email or web address]  URL: [email or web address]  Date: 07-  Prepared By: Alexis Lawrence    Program Notes      Exercises      - Seated Cervical Retraction - 7 x daily - 7 x weekly -  sets - 20 reps      - Seated Cervical Retraction and Extension - 7 x daily - 7 x weekly -  sets - 20 reps

## 2025-07-17 NOTE — PROGRESS NOTES
Daily Note     Today's date: 2025  Patient name: Maribel Albarado  : 1960  MRN: [id number]  Referring provider: Ashkan Thorntno MD  Dx:   Encounter Diagnosis     ICD-10-CM    1. Cervical radiculopathy  M54.12       2. Impingement syndrome of right shoulder  M75.41       3. Tendinitis of right rotator cuff  M75.81           Start Time: 1416  Stop Time: 1459  Total time in clinic (min): 43 minutes    Subjective: Patient reports that her shoulder has been pretty good recently. She states that it really only bothers her with ER while laying supine at night      Objective: See treatment diary below      Assessment: Patient demonstrates improving R shoulder strength this session.  Patient completed today's session with increase in R sided low back pain from lying on side to perform shoulder strengthening.  Continued with cervical based exercises at the start of the session to allow for improve shoulder mobility and strength.  UBE performed in order to improve UE muscular endurance.  Future sessions should continue to progress strengthening exercises as able. Tolerated treatment fair. Patient would benefit from continued PT.      Plan: Continue per plan of care.      Daily Treatment Diary     Precautions: asthma, hx breast cancer, HTN, SPINAL CORD STIMULATOR, C5-6 fusion    POC Expires Reeval for Medicare to be completed  Unit Limit Auth Expiration Date PT/OT/STVisit Limit   25 By visit 10 BOMN N/a BOMN    Completed on visit 1                   TREATMENT DIARY  Auth Status DATE      NA Visit # 1 2 3 4      Remaining         MANUAL THERAPY                  Thoracic PA mobs         GH posterior mobs         GH inferior mobs         Thoracic ext MWM                           THERAPEUTIC EXERCISE HEP         TB row   X30  otb X30  Gtb  seated X25  btb     TB ext   X30  otb X30  gtb X25  btb     TB ER   def X15 light  Otb seated X20  otb     SL ER   x20 X20  1# X20  1#     No monies     X20  gtb                          SL ABD   x20 X20  1# X10  1#     SL horizontal ABD                                                  Pt edu          UBE   3'3' 3'3' 3'3'     NEUROMUSCULAR REEDUCATION           Seated thoracic ext   x20 x20 x18     Cervical retraction x HEP 3x10  x20 x30 x30     Retraction extension x  x10 x10 x20     Ulnar nerve glide          Median nerve glide                              Pt edu  GS- HEP, centralization                                                                    THERAPEUTIC ACTIVITY                                                  GAIT TRAINING                                                  MODALITIES

## 2025-07-22 ENCOUNTER — APPOINTMENT (OUTPATIENT)
Dept: PHYSICAL THERAPY | Facility: CLINIC | Age: 65
End: 2025-07-22
Attending: ORTHOPAEDIC SURGERY
Payer: MEDICARE

## 2025-07-29 ENCOUNTER — EVALUATION (OUTPATIENT)
Dept: PHYSICAL THERAPY | Facility: CLINIC | Age: 65
End: 2025-07-29
Attending: ORTHOPAEDIC SURGERY
Payer: MEDICARE

## 2025-07-29 DIAGNOSIS — M54.12 CERVICAL RADICULOPATHY: ICD-10-CM

## 2025-07-29 DIAGNOSIS — M75.41 IMPINGEMENT SYNDROME OF RIGHT SHOULDER: Primary | ICD-10-CM

## 2025-07-29 DIAGNOSIS — M75.81 TENDINITIS OF RIGHT ROTATOR CUFF: ICD-10-CM

## 2025-07-29 PROCEDURE — 97112 NEUROMUSCULAR REEDUCATION: CPT

## 2025-07-29 PROCEDURE — 97110 THERAPEUTIC EXERCISES: CPT

## 2025-08-14 ENCOUNTER — OFFICE VISIT (OUTPATIENT)
Dept: OBGYN CLINIC | Facility: CLINIC | Age: 65
End: 2025-08-14
Payer: MEDICARE

## 2025-08-14 ENCOUNTER — EVALUATION (OUTPATIENT)
Dept: PHYSICAL THERAPY | Facility: CLINIC | Age: 65
End: 2025-08-14
Attending: ORTHOPAEDIC SURGERY
Payer: MEDICARE